# Patient Record
Sex: FEMALE | Race: WHITE
[De-identification: names, ages, dates, MRNs, and addresses within clinical notes are randomized per-mention and may not be internally consistent; named-entity substitution may affect disease eponyms.]

---

## 2017-06-25 NOTE — EDM.PDOC
ED HPI GENERAL MEDICAL PROBLEM





- General


Chief Complaint: Lower Extremity Injury/Pain


Stated Complaint: HURT RT FOOT


Time Seen by Provider: 06/25/17 12:01


Source of Information: Reports: Patient, RN Notes Reviewed


History Limitations: Reports: No Limitations





- History of Present Illness


INITIAL COMMENTS - FREE TEXT/NARRATIVE: 





47-year-old female presents emergency department day complaint of right foot 

pain she is unsure how she injured herself she is experiencing pain on the 

medial aspect of her foot and it's difficult for her to bear weight





- Related Data


 Allergies











Allergy/AdvReac Type Severity Reaction Status Date / Time


 


ketorolac tromethamine Allergy  Itching Verified 09/29/15 19:25





[From Toradol]     











Home Meds: 


 Home Meds





Acyclovir [Acyclovir] 200 mg PO ASDIRECTED PRN 09/29/15 [History]


Citalopram [Citalopram HBr] 20 mg PO QAM 09/29/15 [History]


Cyclobenzaprine [Flexeril] 10 mg PO TID PRN 09/29/15 [History]











Past Medical History


Other HEENT History: cold sores


Other Musculoskeletal History: chronic neck pain





- Past Surgical History


Other Female  Surgeries/Procedures: right ovary removed, left fallopian rube 

removed





Social & Family History





- Tobacco Use


Smoking Status *Q: Current Every Day Smoker


Years of Tobacco use: 35


Packs/Tins Daily: 0.5





- Alcohol Use


Days Per Week of Alcohol Use: 2


Number of Drinks Per Day: 3


Total Drinks Per Week: 6





- Recreational Drug Use


Recreational Drug Use: No





Review of Systems





- Review of Systems


Review Of Systems: See Below


Respiratory: Reports: No Symptoms


Cardiovascular: Reports: No Symptoms


Musculoskeletal: Reports: Foot Pain





ED EXAM, GENERAL





- Physical Exam


Exam: See Below


Free Text/Narrative:: 





Examination of the right foot appreciate any erythema there is no edema noted 

she has full range of motion of the ankle there is no tenderness to the ankle 

to tilt test or anterior drawer, there is no tenderness to the knee pedal 

pulses 2+ she is tender to palpation medial aspect of the foot over metatarsal #

1





Course





- Vital Signs


Last Recorded V/S: 


 Last Vital Signs











Temp  98.8 F   06/25/17 12:42


 


Pulse  75   06/25/17 12:42


 


Resp  16   06/25/17 12:42


 


BP  125/83   06/25/17 12:42


 


Pulse Ox  96   06/25/17 12:42














- Orders/Labs/Meds


Orders: 


 Active Orders 24 hr











 Category Date Time Status


 


 Foot Comp Min 3V Rt [CR] Stat Exams  06/25/17 12:48 Taken











Meds: 


Medications














Discontinued Medications














Generic Name Dose Route Start Last Admin





  Trade Name Kayce  PRN Reason Stop Dose Admin


 


Ketorolac Tromethamine  60 mg  06/25/17 12:47  06/25/17 13:00





  Toradol  IM  06/25/17 12:48  60 mg





  ONETIME ONE   Administration














Departure





- Departure


Time of Disposition: 13:08


Disposition: Home, Self-Care 01


Condition: Good


Clinical Impression: 


 Right foot pain








- Discharge Information


Forms:  ED Department Discharge


Additional Instructions: 


Use ibuprofen for baseline pain control, use hydrocodone for breakthrough pain, 

continue to use the crutches until reevaluated by your primary care, call or 

return to the emergency department with worsening of symptoms please follow-up 

in the next 3-5 days for reevaluation





- My Orders


Last 24 Hours: 


My Active Orders





06/25/17 12:48


Foot Comp Min 3V Rt [CR] Stat 














- Assessment/Plan


Last 24 Hours: 


My Active Orders





06/25/17 12:48


Foot Comp Min 3V Rt [CR] Stat 











Plan: 





Assessment





Acuity = acute





Site and laterality = right foot pain





Etiology  = unclear etiology





Manifestations = none





Location of injury =  home





Lab values = foot x-ray I did review films myself I cannot appreciate any acute 

process, the official read from radiology is pending





Plan


She had good relief with the Toradol injection she is placed in a posterior 

splint and crutches hydrocodone total #10 written for pain control follow up 

with primary care in the next 3-5 days for reevaluation I did instruct her that 

the radiology read is different than my own we will contact her

















Patient was in agreement with the plan all questions were answered, they were 

instructed to return to the emergency department or call for worsening 

symptoms.  This note was dictated using dragon voice recognition software 

please call with any questions.

## 2017-06-26 NOTE — CR
Foot Comp Min 3V Rt

 

HISTORY: Pain

 

COMPARISON: None

 

FINDINGS: No fracture or dislocation. No bony destructive process.

## 2022-10-03 ENCOUNTER — HOSPITAL ENCOUNTER (EMERGENCY)
Dept: HOSPITAL 11 - JP.ED | Age: 52
End: 2022-10-03
Payer: MEDICAID

## 2022-10-03 DIAGNOSIS — F10.929: ICD-10-CM

## 2022-10-03 DIAGNOSIS — F41.9: Primary | ICD-10-CM

## 2022-10-03 DIAGNOSIS — Z88.8: ICD-10-CM

## 2022-10-03 DIAGNOSIS — Z88.5: ICD-10-CM

## 2022-10-03 DIAGNOSIS — E83.51: ICD-10-CM

## 2022-10-03 DIAGNOSIS — R45.851: ICD-10-CM
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
